# Patient Record
Sex: MALE | Race: WHITE | Employment: FULL TIME | URBAN - METROPOLITAN AREA
[De-identification: names, ages, dates, MRNs, and addresses within clinical notes are randomized per-mention and may not be internally consistent; named-entity substitution may affect disease eponyms.]

---

## 2019-06-19 ENCOUNTER — HOSPITAL ENCOUNTER (OUTPATIENT)
Dept: CARDIAC CATH/INVASIVE PROCEDURES | Age: 29
Discharge: HOME | End: 2019-06-19
Attending: SPECIALIST | Admitting: SPECIALIST
Payer: COMMERCIAL

## 2019-06-19 VITALS
SYSTOLIC BLOOD PRESSURE: 115 MMHG | BODY MASS INDEX: 27.31 KG/M2 | HEIGHT: 76 IN | DIASTOLIC BLOOD PRESSURE: 54 MMHG | TEMPERATURE: 97.8 F | HEART RATE: 62 BPM | WEIGHT: 224.25 LBS | OXYGEN SATURATION: 99 % | RESPIRATION RATE: 20 BRPM

## 2019-06-19 DIAGNOSIS — I48.91 ATRIAL FIBRILLATION, UNSPECIFIED TYPE (HCC): ICD-10-CM

## 2019-06-19 LAB
ANION GAP SERPL CALC-SCNC: 2 MMOL/L (ref 5–15)
BUN SERPL-MCNC: 11 MG/DL (ref 6–20)
BUN/CREAT SERPL: 12 (ref 12–20)
CALCIUM SERPL-MCNC: 9.3 MG/DL (ref 8.5–10.1)
CHLORIDE SERPL-SCNC: 108 MMOL/L (ref 97–108)
CO2 SERPL-SCNC: 29 MMOL/L (ref 21–32)
CREAT SERPL-MCNC: 0.93 MG/DL (ref 0.7–1.3)
GLUCOSE SERPL-MCNC: 102 MG/DL (ref 65–100)
MAGNESIUM SERPL-MCNC: 2.5 MG/DL (ref 1.6–2.4)
POTASSIUM SERPL-SCNC: 4.4 MMOL/L (ref 3.5–5.1)
SODIUM SERPL-SCNC: 139 MMOL/L (ref 136–145)

## 2019-06-19 PROCEDURE — 80048 BASIC METABOLIC PNL TOTAL CA: CPT

## 2019-06-19 PROCEDURE — 83735 ASSAY OF MAGNESIUM: CPT

## 2019-06-19 PROCEDURE — 74011000258 HC RX REV CODE- 258: Performed by: SPECIALIST

## 2019-06-19 PROCEDURE — 92960 CARDIOVERSION ELECTRIC EXT: CPT

## 2019-06-19 PROCEDURE — 77030018729 HC ELECTRD DEFIB PAD CARD -B

## 2019-06-19 PROCEDURE — 74011000250 HC RX REV CODE- 250: Performed by: SPECIALIST

## 2019-06-19 PROCEDURE — 36415 COLL VENOUS BLD VENIPUNCTURE: CPT

## 2019-06-19 PROCEDURE — 99152 MOD SED SAME PHYS/QHP 5/>YRS: CPT

## 2019-06-19 RX ORDER — ASPIRIN 325 MG
325 TABLET ORAL DAILY
COMMUNITY
End: 2019-08-04

## 2019-06-19 RX ADMIN — METHOHEXITAL SODIUM 61.02 MG: 500 INJECTION, POWDER, LYOPHILIZED, FOR SOLUTION INTRAMUSCULAR; INTRAVENOUS; RECTAL at 13:00

## 2019-06-19 RX ADMIN — METHOHEXITAL SODIUM 61.02 MG: 500 INJECTION, POWDER, LYOPHILIZED, FOR SOLUTION INTRAMUSCULAR; INTRAVENOUS; RECTAL at 12:59

## 2019-06-19 NOTE — PROGRESS NOTES
TRANSFER - IN REPORT:    Verbal report received from Ed (name) on Smita Moreno  being received from [facility] (unit) for ordered procedure      Report consisted of patients Situation, Background, Assessment and   Recommendations(SBAR). Information from the following report(s) SBAR was reviewed with the receiving nurse. Opportunity for questions and clarification was provided. Assessment completed upon patients arrival to unit and care assumed.

## 2019-06-19 NOTE — DISCHARGE INSTRUCTIONS
Discharge Instructions:     Medications: Activity:     As tolerated. Diet:     American Heart Association. Follow-up:     Follow up with Tess Schroeder MD on June 26th, 2019 at 9:45am.  [address]  [phone]      If you smoke, STOP!

## 2019-06-19 NOTE — H&P
Date of Surgery Update:  Tamika Wood was seen and examined. History and physical has been reviewed. The patient has been examined. There have been no significant clinical changes since the completion of the originally dated History and Physical.    Signed By: Mary Rizvi MD     2019 12:17 PM           Cornelius Spencer 1990   Office/Outpatient Visit  Visit Date:  09:37 am  Provider: Gabriela Swann MD (Assistant: Fern Schwarz LPN)  Location: [facility])[address] [place] [address] [phone]    Electronically signed by Gabriela Swann MD on  2019 11:48:48 AM                           SUBJECTIVE:    CC:   Mr. Charlene Azar is a 26year old White male. His primary care physician is Cris Marie MD.  This is a 1 day follow-up visit. He has a history of atrial fibrillation. HPI:     Regarding parosxymal atrial fibrillation,  His [id number] score is 0. Current related medications include Aspirin. He has noted an irregular heartbeat. He has not been aware of syncope. Heavy drinking over the weekend. Noticed palpitation Monday evening. Older brother has atrial fibrillation. Another older brother had an enlarged heart and  of cardiac arrest at 25. Had palpitaton 2018 in [facility], saw a cardiologist.  ECG- sinus. Advised to have a monitor. PMH/FMH/SH:   Last Reviewed on 2019 09:43 AM by Homar Cordova  Past Medical History:     ADD/ADHD     PREVENTIVE HEALTH MAINTENANCE       INFLUENZA VACCINE: has never been done     Surgical History:   Surgical/Procedural History: NONE     Family History:   Father: Healthy; Mother: Healthy; Living   Brother(s):  Positive for Sudden Cardiac Death;      Social History:   Social History:   Occupation: consulatant;   Marital Status: Single   Children: None     Tobacco/Alcohol/Supplements:   Last Reviewed on 2019 09:43 AM by Homar Cordova  TOBACCO/ALCOHOL/SUPPLEMENTS Tobacco: He has never smoked. Alcohol: Drinks alcohol on a social basis only. Caffeine:  He admits to consuming caffeine via coffee ( 1 serving per day ). Substance Abuse History:   Last Reviewed on 6/19/2019 09:43 AM by Sarahy Garcia  Substance Use/Abuse:   None     Mental Health History:   Last Reviewed on 6/19/2019 09:43 AM by Sarahy Garcia    Communicable Diseases (eg STDs): Last Reviewed on 6/19/2019 09:43 AM by Sarahy Garcia      Current Problems:   Last Reviewed on 6/19/2019 09:43 AM by Sarahy Garcia  Atrial fibrillation   Palpitations     Allergies:   Last Reviewed on 6/19/2019 09:43 AM by Sarahy Garcia    No Known Drug Allergies. Current Medications:   Last Reviewed on 6/19/2019 09:43 AM by Ulysees Bare  Aspirin (ASA) 325mg Tablet 1 po qd   Vyvanse 20mg Capsules Take 1 capsule(s) by mouth qam     OBJECTIVE:    Vitals:     Historical:   06/18/2019  BP:   127/76 mm Hg ( (left arm, , standing, );)   06/18/2019  BP:   107/74 mm Hg ( (left arm, , sitting, );)   06/18/2019  Wt:   226lbs    Current: 6/19/2019 9:43:19 AM  Ht:  6 ft, 4 in; Wt: 224 lbs;  BMI: 27.3  BP: 115/79 mm Hg (left arm, sitting);  P: 76 bpm    Repeat:   9:43:30 AM     BP:   140/80mm Hg (left arm, standing)     Exams:   GENERAL:  Alert, oriented to person, place and time. HEENT:  Pinkish palpebral  conjunctivae. Anicteric sclerae. NECK:  No jugular vein engorgement. No bruit. CHEST: Equal expansion. Clear breath sounds. No rales, no wheezing. Heart: Irregular rhythm. ABDOMEN:  Soft. Normal active bowel sounds. No tenderness. EXTREMITIES:  No pitting pedal edema. Equal pulses bilaterally. NEURO:  Grossly intact. Procedures:   Atrial fibrillation     ECG INTERPRETATION: See scanned EKG for results.         ASSESSMENT       427.31   I48.91  Atrial fibrillation            DDx:     ORDERS:     Procedures Ordered:     41028  Electrocardiogram, routine with at least 12 leads; with interpretation and report  (In-House)       22464  Education and train for pt self-mgmt by qualified, nonphysician, cornelia 30 minutes; individual pt  (Send-Out)       XECD  Echocardiogram  (In-House)       Other Orders:     [id number]  Queried Patient for Tobacco Use  (Send-Out)           PLAN:      Atrial fibrillation   1. Medication list has been reviewed. Continue current medications. Smoking Status:  Nonsmoker   2. Advised the patient regarding diet, exercise, and lifestyle modification. Explained treatment options for atrial fibrillation. Patient understood and agreed to proceed with electrical cardioversion. 3.  The patient to call the office if there is any change in his cardiac symptoms. 4.  Explained to the patient the importance of controlling his cardiac risk factors. Testing/Procedures:  Echocardiogram - schedule to be done today  Cardioversion - to be done today to be done at at [facility].      Schedule a follow-up appointment in 3 days.

## 2019-06-19 NOTE — PROCEDURES
Indication:  Atrial fibrillation. Procedure:  Informed consent was obtained. The patient was anesthetized with 0.6mg/kg brevital.  The patient was converted from atrial fibrillation to normal sinus rhythm with a single biphasic shock of 360 joules. The patient tolerated the procedure well without obvious complications and was transferred to the holding room in stable condition. Summary:  Successful DCCV of atrial fibrillation to NSR. F/U with Dr. Keyona Chang on 6/26.

## 2019-06-19 NOTE — PROGRESS NOTES
TRANSFER - OUT REPORT:    Verbal report to be given at bedside on Nadia Harris being transferred to [facility] (unit) for routine post - op       Report consisted of patient's Situation, Background, Assessment and   Recommendations(SBAR). Information from the following report(s) SBAR was reviewed with the receiving nurse. Opportunity for questions and clarification was provided.       Patient transported with:   Registered Nurse

## 2019-06-19 NOTE — PROGRESS NOTES
1130    Patient arrived. ID and allergies verified verbally with patient. Pt voices understanding of procedure to be performed. Consent obtained. Pt prepped for procedure. DCCV    1240    TRANSFER - OUT REPORT:    Verbal report given to Larry Gill RN(name) on [facility]  being transferred to  [facility] (unit) for routine progression of care       Report consisted of patients Situation, Background, Assessment and   Recommendations(SBAR). Information from the following report(s) SBAR was reviewed with the receiving nurse. Lines:   Peripheral IV 06/19/19 Right Antecubital (Active)        Opportunity for questions and clarification was provided. Patient transported with:   Registered Nurse    [address] [place] [address] REPORT:    Verbal report received from  [facility] FRANKO RN(name) on [facility]  being received from  (unit) for routine progression of care      Report consisted of patients Situation, Background, Assessment and   Recommendations(SBAR). Information from the following report(s) SBAR was reviewed with the receiving nurse. Opportunity for questions and clarification was provided. Assessment completed upon patients arrival to unit and care assumed. 1335    Discharge instructions reviewed with patient and family. Voiced understanding. Patient given copy of discharge instructions to take home. 1400    Pt sat on side of bed then ambulated around unit and to restroom. Voided. Returned to chair. Pt voices no complaints. 0    Pt discharged via wheeled chair  with family. Personal belongings with patient upon discharge.

## 2019-08-04 ENCOUNTER — HOSPITAL ENCOUNTER (EMERGENCY)
Age: 29
Discharge: HOME | End: 2019-08-04
Attending: STUDENT IN AN ORGANIZED HEALTH CARE EDUCATION/TRAINING PROGRAM
Payer: COMMERCIAL

## 2019-08-04 VITALS
HEIGHT: 76 IN | OXYGEN SATURATION: 98 % | HEART RATE: 66 BPM | RESPIRATION RATE: 12 BRPM | SYSTOLIC BLOOD PRESSURE: 108 MMHG | WEIGHT: 225 LBS | TEMPERATURE: 97.7 F | DIASTOLIC BLOOD PRESSURE: 76 MMHG | BODY MASS INDEX: 27.4 KG/M2

## 2019-08-04 DIAGNOSIS — I48.92 ATRIAL FLUTTER, UNSPECIFIED TYPE (HCC): Primary | ICD-10-CM

## 2019-08-04 LAB
ALBUMIN SERPL-MCNC: 4.4 G/DL (ref 3.5–5)
ALBUMIN/GLOB SERPL: 1.3 {RATIO} (ref 1.1–2.2)
ALP SERPL-CCNC: 48 U/L (ref 45–117)
ALT SERPL-CCNC: 27 U/L (ref 12–78)
ANION GAP SERPL CALC-SCNC: 4 MMOL/L (ref 5–15)
AST SERPL-CCNC: 25 U/L (ref 15–37)
BASOPHILS # BLD: 0 K/UL (ref 0–0.1)
BASOPHILS NFR BLD: 1 % (ref 0–1)
BILIRUB SERPL-MCNC: 0.9 MG/DL (ref 0.2–1)
BUN SERPL-MCNC: 12 MG/DL (ref 6–20)
BUN/CREAT SERPL: 11 (ref 12–20)
CALCIUM SERPL-MCNC: 9.4 MG/DL (ref 8.5–10.1)
CHLORIDE SERPL-SCNC: 108 MMOL/L (ref 97–108)
CO2 SERPL-SCNC: 29 MMOL/L (ref 21–32)
CREAT SERPL-MCNC: 1.07 MG/DL (ref 0.7–1.3)
DIFFERENTIAL METHOD BLD: NORMAL
EOSINOPHIL # BLD: 0.1 K/UL (ref 0–0.4)
EOSINOPHIL NFR BLD: 2 % (ref 0–7)
ERYTHROCYTE [DISTWIDTH] IN BLOOD BY AUTOMATED COUNT: 12.4 % (ref 11.5–14.5)
GLOBULIN SER CALC-MCNC: 3.5 G/DL (ref 2–4)
GLUCOSE SERPL-MCNC: 90 MG/DL (ref 65–100)
HCT VFR BLD AUTO: 46.4 % (ref 36.6–50.3)
HGB BLD-MCNC: 15.6 G/DL (ref 12.1–17)
IMM GRANULOCYTES # BLD AUTO: 0 K/UL (ref 0–0.04)
IMM GRANULOCYTES NFR BLD AUTO: 0 % (ref 0–0.5)
LYMPHOCYTES # BLD: 1.9 K/UL (ref 0.8–3.5)
LYMPHOCYTES NFR BLD: 30 % (ref 12–49)
MAGNESIUM SERPL-MCNC: 2.2 MG/DL (ref 1.6–2.4)
MCH RBC QN AUTO: 31.5 PG (ref 26–34)
MCHC RBC AUTO-ENTMCNC: 33.6 G/DL (ref 30–36.5)
MCV RBC AUTO: 93.5 FL (ref 80–99)
MONOCYTES # BLD: 0.6 K/UL (ref 0–1)
MONOCYTES NFR BLD: 10 % (ref 5–13)
NEUTS SEG # BLD: 3.6 K/UL (ref 1.8–8)
NEUTS SEG NFR BLD: 57 % (ref 32–75)
NRBC # BLD: 0 K/UL (ref 0–0.01)
NRBC BLD-RTO: 0 PER 100 WBC
PLATELET # BLD AUTO: 240 K/UL (ref 150–400)
PMV BLD AUTO: 9.5 FL (ref 8.9–12.9)
POTASSIUM SERPL-SCNC: 4.2 MMOL/L (ref 3.5–5.1)
PROT SERPL-MCNC: 7.9 G/DL (ref 6.4–8.2)
RBC # BLD AUTO: 4.96 M/UL (ref 4.1–5.7)
SODIUM SERPL-SCNC: 141 MMOL/L (ref 136–145)
TROPONIN I SERPL-MCNC: <0.05 NG/ML
WBC # BLD AUTO: 6.3 K/UL (ref 4.1–11.1)

## 2019-08-04 PROCEDURE — 85025 COMPLETE CBC W/AUTO DIFF WBC: CPT

## 2019-08-04 PROCEDURE — 80053 COMPREHEN METABOLIC PANEL: CPT

## 2019-08-04 PROCEDURE — 84484 ASSAY OF TROPONIN QUANT: CPT

## 2019-08-04 PROCEDURE — 99285 EMERGENCY DEPT VISIT HI MDM: CPT

## 2019-08-04 PROCEDURE — 36415 COLL VENOUS BLD VENIPUNCTURE: CPT

## 2019-08-04 PROCEDURE — 93005 ELECTROCARDIOGRAM TRACING: CPT

## 2019-08-04 PROCEDURE — 83735 ASSAY OF MAGNESIUM: CPT

## 2019-08-04 NOTE — DISCHARGE INSTRUCTIONS
Patient Education        Atrial Flutter: Care Instructions  Your Care Instructions    Atrial flutter is a type of heartbeat problem (arrhythmia) that usually causes a fast heart rate. In atrial flutter, a problem with the heart's electrical system causes the two upper parts of the heart (the right atrium and the left atrium) to flutter, or beat very fast. Atrial flutter might be diagnosed using an an electrocardiogram (EKG). An EKG translates the heart's electrical activity into line tracings on paper. Treating atrial flutter is important for several reasons. The change in heartbeat can cause blood clots. The clots can travel from your heart to your brain and cause a stroke. A fast heartbeat can make you feel lightheaded, dizzy, and weak. And over time, it can also increase your risk for heart failure. Atrial flutter is often the result of another heart condition, such as coronary artery disease or some other heart rhythm problems. Making changes to improve your heart health will help you stay healthy and active. Your doctor may prescribe medicines to help slow down your heartbeat. You may also take medicine to help prevent a stroke. In some cases, a procedure called catheter ablation is done to stop atrial flutter. Follow-up care is a key part of your treatment and safety. Be sure to make and go to all appointments, and call your doctor if you are having problems. It's also a good idea to know your test results and keep a list of the medicines you take. How can you care for yourself at home? Medicines    · Take your medicines exactly as prescribed. Call your doctor if you think you are having a problem with your medicine. You will get more details on the specific medicines your doctor prescribes.     · If your doctor has given you a blood thinner to prevent a stroke, be sure you get instructions about how to take your medicine safely.  Blood thinners can cause serious bleeding problems.     · Do not take any vitamins, over-the-counter drugs, or herbal products without talking to your doctor first.    Lifestyle changes    · Do not smoke. Smoking can increase your chance of a stroke and heart attack. If you need help quitting, talk to your doctor about stop-smoking programs and medicines. These can increase your chances of quitting for good.     · Eat a heart-healthy diet.     · Stay at a healthy weight. Lose weight if you need to.     · Limit alcohol to 2 drinks a day for men and 1 drink a day for women. Too much alcohol can cause health problems.     · Avoid colds and flu. Get a pneumococcal vaccine shot. If you have had one before, ask your doctor whether you need another dose. Get a flu shot every year. If you must be around people with colds or flu, wash your hands often. Activity    · Talk to your doctor about what type and level of exercise is safe for you. Start light exercise if your doctor says it is okay. Walking is a good choice. Try for at least 30 minutes on most days of the week. You also may want to swim, bike, or do other activities.     · When you exercise, watch for signs that your heart is working too hard. You are pushing too hard if you can't talk while you exercise. If you become short of breath or dizzy or have chest pain, sit down and rest right away. When should you call for help? Call 911 anytime you think you may need emergency care. For example, call if:    · You have symptoms of a stroke. These may include:  ? Sudden numbness, tingling, weakness, or loss of movement in your face, arm, or leg, especially on only one side of your body. ? Sudden vision changes. ? Sudden trouble speaking. ? Sudden confusion or trouble understanding simple statements. ? Sudden problems with walking or balance.   ? A sudden, severe headache that is different from past headaches.     · You passed out (lost consciousness).    Call your doctor now or seek immediate medical care if:    · You have new or increased shortness of breath.     · You feel dizzy or lightheaded, or you feel like you may faint.     · Your heart rate becomes irregular.     · You can feel your heart flutter in your chest or skip heartbeats. Tell your doctor if these symptoms are new or worse.    Watch closely for changes in your health, and be sure to contact your doctor if you have any problems. Where can you learn more? Go to http://haroon-rafiq.info/. Enter K424 in the search box to learn more about \"Atrial Flutter: Care Instructions. \"  Current as of: July 22, 2018  Content Version: 12.1  © 7929-0969 [employer]. Care instructions adapted under license by [employer] (which disclaims liability or warranty for this information). If you have questions about a medical condition or this instruction, always ask your healthcare professional. [place] [address] any warranty or liability for your use of this information. Patient Education        Atrial Fibrillation: Care Instructions  Your Care Instructions    Atrial fibrillation is an irregular and often fast heartbeat. Treating this condition is important for several reasons. It can cause blood clots, which can travel from your heart to your brain and cause a stroke. If you have a fast heartbeat, you may feel lightheaded, dizzy, and weak. An irregular heartbeat can also increase your risk for heart failure. Atrial fibrillation is often the result of another heart condition, such as high blood pressure or coronary artery disease. Making changes to improve your heart condition will help you stay healthy and active. Follow-up care is a key part of your treatment and safety. Be sure to make and go to all appointments, and call your doctor if you are having problems. It's also a good idea to know your test results and keep a list of the medicines you take. How can you care for yourself at home?   Medicines    · Take your medicines exactly as prescribed. Call your doctor if you think you are having a problem with your medicine. You will get more details on the specific medicines your doctor prescribes.     · If your doctor has given you a blood thinner to prevent a stroke, be sure you get instructions about how to take your medicine safely. Blood thinners can cause serious bleeding problems.     · Do not take any vitamins, over-the-counter drugs, or herbal products without talking to your doctor first.    Lifestyle changes    · Do not smoke. Smoking can increase your chance of a stroke and heart attack. If you need help quitting, talk to your doctor about stop-smoking programs and medicines. These can increase your chances of quitting for good.     · Eat a heart-healthy diet.     · Stay at a healthy weight. Lose weight if you need to.     · Limit alcohol to 2 drinks a day for men and 1 drink a day for women. Too much alcohol can cause health problems.     · Avoid colds and flu. Get a pneumococcal vaccine shot. If you have had one before, ask your doctor whether you need another dose. Get a flu shot every year. If you must be around people with colds or flu, wash your hands often. Activity    · If your doctor recommends it, get more exercise. Walking is a good choice. Bit by bit, increase the amount you walk every day. Try for at least 30 minutes on most days of the week. You also may want to swim, bike, or do other activities. Your doctor may suggest that you join a cardiac rehabilitation program so that you can have help increasing your physical activity safely.     · Start light exercise if your doctor says it is okay. Even a small amount will help you get stronger, have more energy, and manage stress. Walking is an easy way to get exercise. Start out by walking a little more than you did in the hospital. Gradually increase the amount you walk.     · When you exercise, watch for signs that your heart is working too hard.  You are pushing too hard if you cannot talk while you are exercising. If you become short of breath or dizzy or have chest pain, sit down and rest immediately.     · Check your pulse regularly. Place two fingers on the artery at the palm side of your wrist, in line with your thumb. If your heartbeat seems uneven or fast, talk to your doctor. When should you call for help? Call 911 anytime you think you may need emergency care. For example, call if:    · You have symptoms of a heart attack. These may include:  ? Chest pain or pressure, or a strange feeling in the chest.  ? Sweating. ? Shortness of breath. ? Nausea or vomiting. ? Pain, pressure, or a strange feeling in the back, neck, jaw, or upper belly or in one or both shoulders or arms. ? Lightheadedness or sudden weakness. ? A fast or irregular heartbeat. After you call 911, the  may tell you to chew 1 adult-strength or 2 to 4 low-dose aspirin. Wait for an ambulance. Do not try to drive yourself.     · You have symptoms of a stroke. These may include:  ? Sudden numbness, tingling, weakness, or loss of movement in your face, arm, or leg, especially on only one side of your body. ? Sudden vision changes. ? Sudden trouble speaking. ? Sudden confusion or trouble understanding simple statements. ? Sudden problems with walking or balance. ? A sudden, severe headache that is different from past headaches.     · You passed out (lost consciousness).    Call your doctor now or seek immediate medical care if:    · You have new or increased shortness of breath.     · You feel dizzy or lightheaded, or you feel like you may faint.     · Your heart rate becomes irregular.     · You can feel your heart flutter in your chest or skip heartbeats. Tell your doctor if these symptoms are new or worse.    Watch closely for changes in your health, and be sure to contact your doctor if you have any problems. Where can you learn more?   Go to http://haroon-rafiq.info/. Enter U020 in the search box to learn more about \"Atrial Fibrillation: Care Instructions. \"  Current as of: July 22, 2018  Content Version: 12.1  © 9030-9097 [employer]. Care instructions adapted under license by [employer] (which disclaims liability or warranty for this information). If you have questions about a medical condition or this instruction, always ask your healthcare professional. [place] [address] any warranty or liability for your use of this information.

## 2019-08-04 NOTE — ED NOTES

## 2019-08-04 NOTE — ED TRIAGE NOTES
Pt reports feeling heart flutters since 2 AM this AM. States that he feels like he is in Afib. Has PMH of same.

## 2019-08-04 NOTE — ED PROVIDER NOTES
Annette Osorio. Shaheen Valencia is a 26 y.o. male who presents ambulatory to the ED with a c/o palpitations today. Pt reports that he began feeling \"fluttering in his chest since 2am this morning. Pt states that he has had similar episodes of sx in the past, and thinks he is in A-fib. Pt states he took 1 81 mg ASA today. Pt notes that the most recent occurrence of these sx was 2 months ago,and he was shocked back into a Normal Sinus Rhythm by Dr. Fahad Wilburn. Pt specifically denies chest pain, shortness of breath, n/v,d , fever, chills, numbness, tingling, abdominal pain, back pain, cough, leg swelling, dizziness or any other acute sx. Pt denies any recent travel, known sick contacts, or recent illness. PCP: Matthew Mora MD  PMHx significant for: none reported  PSHx significant for:  Colonoscopy  Social Hx: Tobacco: none EtOH: occaisional Illicit drug use: none    There are no further complaints or symptoms at this time. Signed by: Alena Ganser, scribing for Evelia Samson MD on August 4th, 2019 at 5:39 PM.    The history is provided by the patient and medical records. No  was used. No past medical history on file.     Past Surgical History:   Procedure Laterality Date    HX COLONOSCOPY           Family History:   Problem Relation Age of Onset    Heart Disease Brother 18        congenital, sudden death    Cancer Maternal Grandfather        Social History     Socioeconomic History    Marital status: SINGLE     Spouse name: Not on file    Number of children: Not on file    Years of education: Not on file    Highest education level: Not on file   Occupational History    Not on file   Social Needs    Financial resource strain: Not on file    Food insecurity:     Worry: Not on file     Inability: Not on file    Transportation needs:     Medical: Not on file     Non-medical: Not on file   Tobacco Use    Smoking status: Never Smoker   Substance and Sexual Activity    Alcohol use: Yes     Alcohol/week: 2.5 standard drinks     Types: 3 drink(s) per week    Drug use: No    Sexual activity: Not Currently   Lifestyle    Physical activity:     Days per week: Not on file     Minutes per session: Not on file    Stress: Not on file   Relationships    Social connections:     Talks on phone: Not on file     Gets together: Not on file     Attends [employer] service: Not on file     Active member of club or organization: Not on file     Attends meetings of clubs or organizations: Not on file     Relationship status: Not on file    Intimate partner violence:     Fear of current or ex partner: Not on file     Emotionally abused: Not on file     Physically abused: Not on file     Forced sexual activity: Not on file   Other Topics Concern    Not on file   Social History Narrative    Not on file         ALLERGIES: Patient has no known allergies. Review of Systems   Constitutional: Negative for chills and fever. Respiratory: Negative for cough and shortness of breath. Cardiovascular: Positive for palpitations. Negative for chest pain. Gastrointestinal: Negative for abdominal pain, diarrhea, nausea and vomiting. Neurological: Negative for dizziness and headaches. All other systems reviewed and are negative. Vitals:    08/04/19 1653   BP: 119/84   Pulse: 63   Resp: 18   Temp: 97.7 °F (36.5 °C)   SpO2: 97%   Weight: 102.1 kg (225 lb)   Height: 6' 4\" (1.93 m)            Physical Exam   Constitutional: He is oriented to person, place, and time. He appears well-developed and well-nourished. No distress. NAD, AxOx4, speaking in complete sentences     HENT:   Head: Normocephalic and atraumatic. Mouth/Throat: Oropharynx is clear and moist. No oropharyngeal exudate. Eyes: Pupils are equal, round, and reactive to light. Conjunctivae and EOM are normal. Right eye exhibits no discharge. Left eye exhibits no discharge. Neck: Normal range of motion. Neck supple.    Cardiovascular: Normal rate, normal heart sounds and intact distal pulses. Exam reveals no gallop and no friction rub. No murmur heard. A fib / variable  flutter   Pulmonary/Chest: Effort normal and breath sounds normal. No respiratory distress. He has no wheezes. He has no rales. He exhibits no tenderness. Abdominal: Soft. Bowel sounds are normal. He exhibits no distension and no mass. There is no tenderness. There is no rebound and no guarding. nttp   Genitourinary:   Genitourinary Comments: Pt denies urinary/ Testicular/ scrotal or penile  complaints   Musculoskeletal: Normal range of motion. He exhibits no edema, tenderness or deformity. Lymphadenopathy:     He has no cervical adenopathy. Neurological: He is alert and oriented to person, place, and time. He displays normal reflexes. No cranial nerve deficit or sensory deficit. He exhibits normal muscle tone. Coordination normal.   Skin: Skin is warm and dry. Capillary refill takes less than 2 seconds. No rash noted. No erythema. No pallor. Psychiatric: He has a normal mood and affect. Nursing note and vitals reviewed. MDM       Procedures      Chief Complaint   Patient presents with    Irregular Heart Beat       6:19 PM  The patients presenting problems have been discussed, and they are in agreement with the care plan formulated and outlined with them. I have encouraged them to ask questions as they arise throughout their visit.     MEDICATIONS GIVEN:  Medications - No data to display    LABS REVIEWED:  Labs Reviewed   METABOLIC PANEL, COMPREHENSIVE - Abnormal; Notable for the following components:       Result Value    Anion gap 4 (*)     BUN/Creatinine ratio 11 (*)     All other components within normal limits   CBC WITH AUTOMATED DIFF   MAGNESIUM   TROPONIN I       RADIOLOGY RESULTS:  The following have been ordered and reviewed:  _____________________________________________________________________  _____________________________________________________________________    EKG interpretation:   Rhythm: normal sinus rhythm; and regular . Rate (approx.): 54; Axis: normal; P wave: normal; QRS interval: normal ; ST/T wave: normal; Negative acute significant segmental elevations/ compared to study 9/29/2016 a flutter/ fib has replaced nsr;     PROCEDURES:        CONSULTATIONS:       PROGRESS NOTES:      DIAGNOSIS:    1. Atrial flutter, unspecified type ([address]        PLAN:  1- afib/ variable flutter/ Pt offered/ refused electrical cardioversion currently / 'wants to see Dr Yudith Mar';       ED COURSE: The patients hospital course has been uncomplicated. 6:20 PM  Pt told of results thus far/ agrees w/ cardiology consultstion;     CONSULT  NOTE  6:36 PM  Shirlene Carpenter MD spoke with Dr Elham Cerrato. Specialty: cardiology  Discussed pt's hx, disposition, and available diagnostic and imaging results. Reviewed care plans. Consulting physician agrees with plans as outlined 'Harles Sat to cardiovert there if you want'; .    Written by Lily Enriquez MD, ED Scribe as dictated by Shirlene Carpenter MD.     6:37 PM In discussion with Pt/ offered/ refused cardioversion/ 'Will see Dr Yudith Mar tomorrow'; Cameron Lior  results have been reviewed with him. He has been counseled regarding his diagnosis. He verbally conveys understanding and agreement of the signs, symptoms, diagnosis, treatment and prognosis and additionally agrees to Call/ Arrange follow up as recommended with Dr. Gina Friend MD in 24 - 48 hours. He also agrees with the care-plan and conveys that all of his questions have been answered.   I have also put together some discharge instructions for him that include: 1) educational information regarding their diagnosis, 2) how to care for their diagnosis at home, as well a 3) list of reasons why they would want to return to the ED prior to their follow-up appointment, should their condition change or for concerns.

## 2019-08-05 ENCOUNTER — HOSPITAL ENCOUNTER (OUTPATIENT)
Dept: CARDIAC CATH/INVASIVE PROCEDURES | Age: 29
Discharge: HOME | End: 2019-08-05
Attending: SPECIALIST | Admitting: SPECIALIST
Payer: COMMERCIAL

## 2019-08-05 VITALS
SYSTOLIC BLOOD PRESSURE: 110 MMHG | BODY MASS INDEX: 26.85 KG/M2 | HEIGHT: 76 IN | OXYGEN SATURATION: 100 % | RESPIRATION RATE: 16 BRPM | DIASTOLIC BLOOD PRESSURE: 74 MMHG | HEART RATE: 70 BPM | TEMPERATURE: 98.3 F | WEIGHT: 220.5 LBS

## 2019-08-05 DIAGNOSIS — I48.91 ATRIAL FIBRILLATION, UNSPECIFIED TYPE (HCC): ICD-10-CM

## 2019-08-05 LAB
ATRIAL RATE: 416 BPM
CALCULATED R AXIS, ECG10: 17 DEGREES
CALCULATED T AXIS, ECG11: 34 DEGREES
DIAGNOSIS, 93000: NORMAL
Q-T INTERVAL, ECG07: 400 MS
QRS DURATION, ECG06: 100 MS
QTC CALCULATION (BEZET), ECG08: 379 MS
VENTRICULAR RATE, ECG03: 54 BPM

## 2019-08-05 PROCEDURE — 99152 MOD SED SAME PHYS/QHP 5/>YRS: CPT

## 2019-08-05 PROCEDURE — 74011000250 HC RX REV CODE- 250: Performed by: SPECIALIST

## 2019-08-05 PROCEDURE — 74011000258 HC RX REV CODE- 258: Performed by: SPECIALIST

## 2019-08-05 PROCEDURE — 77030018729 HC ELECTRD DEFIB PAD CARD -B

## 2019-08-05 PROCEDURE — 92960 CARDIOVERSION ELECTRIC EXT: CPT

## 2019-08-05 RX ORDER — ASPIRIN 81 MG/1
81 TABLET ORAL DAILY
COMMUNITY

## 2019-08-05 RX ADMIN — METHOHEXITAL SODIUM 130 MG: 500 INJECTION, POWDER, LYOPHILIZED, FOR SOLUTION INTRAMUSCULAR; INTRAVENOUS; RECTAL at 17:10

## 2019-08-05 NOTE — H&P
Date of Surgery Update:  [facility] was seen and examined. History and physical has been reviewed. The patient has been examined.  There have been no significant clinical changes since the completion of the originally dated History and Physical.    Signed By: Criss Kellogg MD     August 5, 2019 5:09 PM

## 2019-08-05 NOTE — PROGRESS NOTES
TRANSFER - OUT REPORT:    Verbal report given to vasyl(name) on [facility] Guadalupe [facility] for routine post - op       Report consisted of patient's Situation, Background, Assessment and   Recommendations(SBAR). Information from the following report(s) SBAR, Procedure Summary and MAR was reviewed with the receiving nurse. Opportunity for questions and clarification was provided.       Patient transported with:   Registered Nurse

## 2019-08-05 NOTE — DISCHARGE INSTRUCTIONS
Discharge Instructions:     Patient Education        Electrical Cardioversion: Care Instructions  Your Care Instructions    Electrical cardioversion is a treatment for an abnormal heartbeat. For example, it may be used to treat atrial fibrillation. In cardioversion, a brief electric shock is given to the heart to reset its rhythm. The shock comes through patches that are put on the outside of your chest. Cardioversion most often restores the heartbeat to normal.  After the procedure, you may have redness where the patches were. (This may look like a sunburn.) Do not drive until the day after a cardioversion. You can eat and drink when you feel ready to. Your doctor may have you take medicines daily to help the heart beat in a normal way and to prevent blood clots. Your doctor may give you medicine before and after cardioversion. This is to help keep your heart in a normal rhythm after the procedure. Instead of electric cardioversion, your doctor may try to change your heartbeat to a normal rhythm by giving you medicine. This is most often done in a clinic or hospital.  You may have had a sedative to help you relax. You may be unsteady after having sedation. It can take a few hours for the medicine's effects to wear off. Common side effects of sedation include nausea, vomiting, and feeling sleepy or tired. The doctor has checked you carefully, but problems can develop later. If you notice any problems or new symptoms, get medical treatment right away. Follow-up care is a key part of your treatment and safety. Be sure to make and go to all appointments, and call your doctor if you are having problems. It's also a good idea to know your test results and keep a list of the medicines you take. How can you care for yourself at home? Medicines    · If the doctor gave you a sedative:  ? For 24 hours, don't do anything that requires attention to detail.  This includes going to work, making important decisions, or signing any legal documents. It takes time for the medicine's effects to completely wear off.  ? For your safety, do not drive or operate any machinery that could be dangerous. Wait until the medicine wears off and you can think clearly and react easily.     · Take your medicines exactly as prescribed. Call your doctor if you think you are having a problem with your medicine. You may take some of the following medicines:  ? Antiarrhythmic medicines such as amiodarone. ? Beta-blockers such as propranolol (Inderal), metoprolol (Lopressor), and carvedilol (Coreg). ? Calcium channel blockers such as diltiazem (Cardizem) and verapamil (Calan). ? Digoxin (Lanoxin). You will get more details on the specific medicines your doctor prescribes.     · If you take a blood thinner, be sure you get instructions about how to take your medicine safely. Blood thinners can cause serious bleeding problems.     · Do not take any vitamins, over-the-counter medicines, or herbal products without talking to your doctor first.    Lifestyle changes    · Do not smoke. Smoking increases your risk of stroke and heart attack. If you need help quitting, talk to your doctor about stop-smoking programs and medicines. These can increase your chances of quitting for good.     · Eat heart-healthy foods.     · Limit alcohol to 2 drinks a day for men and 1 drink a day for women. Activity    · If your doctor recommends it, get more exercise. Walking is a good choice. Bit by bit, increase the amount you walk every day. Try for 30 minutes on most days of the week. You also may want to swim, bike, or do other activities.     · When you exercise, watch for signs that your heart is working too hard. You are pushing too hard if you cannot talk while you are exercising. If you become short of breath or dizzy or have chest pain, sit down and rest immediately.     · Check your pulse regularly.  Place two fingers on the artery at the palm side of your wrist in line with your thumb. If your heartbeat seems uneven or fast, talk to your doctor. When should you call for help? Call 911 anytime you think you may need emergency care. For example, call if:    · You have trouble breathing.     · You passed out (lost consciousness).     · You cough up pink, foamy mucus and you have trouble breathing.     · You have symptoms of a heart attack. These may include:  ? Chest pain or pressure, or a strange feeling in the chest.  ? Sweating. ? Shortness of breath. ? Nausea or vomiting. ? Pain, pressure, or a strange feeling in the back, neck, jaw, or upper belly or in one or both shoulders or arms. ? Lightheadedness or sudden weakness. ? A fast or irregular heartbeat. After you call 911, the  may tell you to chew 1 adult-strength or 2 to 4 low-dose aspirin. Wait for an ambulance. Do not try to drive yourself.     · You have symptoms of a stroke. These may include:  ? Sudden numbness, tingling, weakness, or loss of movement in your face, arm, or leg, especially on only one side of your body. ? Sudden vision changes. ? Sudden trouble speaking. ? Sudden confusion or trouble understanding simple statements. ? Sudden problems with walking or balance. ? A sudden, severe headache that is different from past headaches.    Call your doctor now or seek immediate medical care if:    · You have new or worse nausea or vomiting.     · You have new or increased shortness of breath.     · You are dizzy or lightheaded, or you feel like you may faint.     · You have sudden weight gain, such as more than 2 to 3 pounds in a day or 5 pounds in a week.     · You have increased swelling in your legs, ankles, or feet.    Watch closely for changes in your health, and be sure to contact your doctor if you have any problems. Where can you learn more? Go to http://haroon-rafiq.info/.   Enter I086 in the search box to learn more about \"Electrical Cardioversion: Care Instructions. \"  Current as of: July 22, 2018  Content Version: 12.1  © 2672-8788 [employer]wise, [employer]. Care instructions adapted under license by [employer] (which disclaims liability or warranty for this information). If you have questions about a medical condition or this instruction, always ask your healthcare professional. [address] any warranty or liability for your use of this information. Medications: Activity:     As tolerated. Diet:     American Heart Association. Follow-up:     Follow up with Elna Koyanagi, MD  [address] [place] [address]  [phone]      If you smoke, STOP!

## 2019-08-05 NOTE — PROCEDURES
Indication:  Atrial fibrillation. Procedure:  Informed consent was obtained. The patient was anesthetized with 0.6mg/kg brevital.  The patient was converted from atrial fibrillation to normal sinus rhythm with a single biphasic shock of 360 joules. The patient tolerated the procedure well without obvious complications and was transferred to the holding room in stable condition. Summary:  Successful DCCV of atrial fibrillation to NSR. F/U with Dr. Rakel Aiken after returning from [facility]. Bridgett Montemayor

## 2019-08-05 NOTE — PROGRESS NOTES
3:16 PM      Patient arrived. ID and allergies verified verbally with patient. Pt voices understanding of procedure to be performed. Consent obtained. Pt prepped for procedure. Labs from 1700 8/4/19  K and MG WNL    Patient states he took 325MG ASA this am  Ate egg at 0800 with \"small\" amount of water      4:52 PM    TRANSFER - OUT REPORT:    Verbal report given to Eusebio Kidd RN (name) on [facility]  being transferred to [facility] (unit) for ordered procedure       Report consisted of patients Situation, Background, Assessment and   Recommendations(SBAR). Information from the following report(s) SBAR was reviewed with the receiving nurse. Lines:   Peripheral IV 08/05/19 Left Antecubital (Active)        Opportunity for questions and clarification was provided. Patient transported with:   Registered Nurse        [address]  REPORT:    Verbal report received from [facility] (name) on [facility]  being received from [facility] (unit) for routine progression of care      Report consisted of patients Situation, Background, Assessment and   Recommendations(SBAR). Information from the following report(s) Procedure Summary was reviewed with the receiving nurse. Opportunity for questions and clarification was provided. Assessment completed upon patients arrival to unit and care assumed.       5:21 PM      Patient sitting up in bed eating. No complaints      1730      Discharge instructions reviewed with patient and family. Voiced understanding. Patient given copy of discharge instructions to take home. 1800    Patient ambulated to restroom and back to [facility]  No complaints  Baseline    6:20 PM    Pt discharged via wheelchair with Nurys Curran. Personal belongings with patient upon discharge.

## 2021-01-26 NOTE — PROGRESS NOTES
HISTORY OF PRESENTING ILLNESS      Ada Cochran is a 31 y.o. male with recurrent paroxysmal symptomatic atrial fibrillation s/p DCCV x 2 which triggers with dehydration, alcohol consumption and fatigue. PAST MEDICAL HISTORY     Past Medical History:   Diagnosis Date    Ill-defined condition     paroxysmal afib hx           PAST SURGICAL HISTORY     Past Surgical History:   Procedure Laterality Date    HX COLONOSCOPY            ALLERGIES     No Known Allergies       FAMILY HISTORY     Family History   Problem Relation Age of Onset    Heart Disease Brother 18        congenital, sudden death    Cancer Maternal Grandfather     negative for cardiac disease       SOCIAL HISTORY     Social History     Socioeconomic History    Marital status: SINGLE     Spouse name: Not on file    Number of children: Not on file    Years of education: Not on file    Highest education level: Not on file   Tobacco Use    Smoking status: Never Smoker    Smokeless tobacco: Never Used   Substance and Sexual Activity    Alcohol use: Yes     Alcohol/week: 2.5 standard drinks     Types: 3 Standard drinks or equivalent per week    Drug use: No    Sexual activity: Not Currently         MEDICATIONS     Current Outpatient Medications   Medication Sig    aspirin delayed-release 81 mg tablet Take 81 mg by mouth daily.  lisdexamfetamine (VYVANSE) 20 mg capsule Take 20 mg by mouth daily. No current facility-administered medications for this visit. I have reviewed the nurses notes, vitals, problem list, allergy list, medical history, family, social history and medications. REVIEW OF SYMPTOMS      General: Pt denies excessive weight gain or loss. Pt is able to conduct ADL's  HEENT: Denies blurred vision, headaches, hearing loss, epistaxis and difficulty swallowing. Respiratory: Denies cough, congestion, shortness of breath, SHORT, wheezing or stridor.   Cardiovascular: Denies precordial pain, palpitations, edema or PND  Gastrointestinal: Denies poor appetite, indigestion, abdominal pain or blood in stool  Genitourinary: Denies hematuria, dysuria, increased urinary frequency  Musculoskeletal: Denies joint pain or swelling from muscles or joints  Neurologic: Denies tremor, paresthesias, headache, or sensory motor disturbance  Psychiatric: Denies confusion, insomnia, depression  Integumentray: Denies rash, itching or ulcers. Hematologic: Denies easy bruising, bleeding       PHYSICAL EXAMINATION      Vitals: see vitals section  General: Well developed, in no acute distress. HEENT: No jaundice, oral mucosa moist, no oral ulcers  Neck: Supple, no stiffness, no lymphadenopathy, supple  Heart:  Normal S1/S2 negative S3 or S4. Regular, no murmur, gallop or rub, no jugular venous distention  Respiratory: Clear bilaterally x 4, no wheezing or rales  Abdomen:   Soft, non-tender, bowel sounds are active. Extremities:  No edema, normal cap refill, no cyanosis. Musculoskeletal: No clubbing, no deformities  Neuro: A&Ox3, speech clear, gait stable, cooperative, no focal neurologic deficits  Skin: Skin color is normal. No rashes or lesions.  Non diaphoretic, moist.  Vascular: 2+ pulses symmetric in all extremities       DIAGNOSTIC DATA      EKG:        LABORATORY DATA      Lab Results   Component Value Date/Time    WBC 6.3 08/04/2019 05:04 PM    HGB 15.6 08/04/2019 05:04 PM    HCT 46.4 08/04/2019 05:04 PM    PLATELET 838 14/41/6246 05:04 PM    MCV 93.5 08/04/2019 05:04 PM      Lab Results   Component Value Date/Time    Sodium 141 08/04/2019 05:04 PM    Potassium 4.2 08/04/2019 05:04 PM    Chloride 108 08/04/2019 05:04 PM    CO2 29 08/04/2019 05:04 PM    Anion gap 4 (L) 08/04/2019 05:04 PM    Glucose 90 08/04/2019 05:04 PM    BUN 12 08/04/2019 05:04 PM    Creatinine 1.07 08/04/2019 05:04 PM    BUN/Creatinine ratio 11 (L) 08/04/2019 05:04 PM    GFR est AA >60 08/04/2019 05:04 PM    GFR est non-AA >60 08/04/2019 05:04 PM Calcium 9.4 08/04/2019 05:04 PM    Bilirubin, total 0.9 08/04/2019 05:04 PM    Alk. phosphatase 48 08/04/2019 05:04 PM    Protein, total 7.9 08/04/2019 05:04 PM    Albumin 4.4 08/04/2019 05:04 PM    Globulin 3.5 08/04/2019 05:04 PM    A-G Ratio 1.3 08/04/2019 05:04 PM    ALT (SGPT) 27 08/04/2019 05:04 PM           ASSESSMENT      1. Atrial fibrillation   A. Symptomatic   B. Paroxysmal   C. DCCV  2. ADD       PLAN     Discussed drug therapy (maintenance vs PIP diltiazem) vs AF ablation. He will consider and notify us of how he wishes to proceed. ICD-10-CM ICD-9-CM    1. Atrial fibrillation, unspecified type ([address]  I48.91 427.31      No orders of the defined types were placed in this encounter. FOLLOW-UP       Thank you, Anant Watt MD and Dr. Olu Camacho for allowing me to participate in the care of this extraordinarily pleasant male. Please do not hesitate to contact me for further questions/concerns.          Christian Miranda MD  Cardiac Electrophysiology / Cardiology    [address]  [address] [facility], [address]  [phone] / [phone] Fax   [phone] / [phone] Fax

## 2021-01-27 ENCOUNTER — OFFICE VISIT (OUTPATIENT)
Dept: CARDIOLOGY CLINIC | Age: 31
End: 2021-01-27
Payer: COMMERCIAL

## 2021-01-27 VITALS
HEART RATE: 55 BPM | HEIGHT: 76 IN | DIASTOLIC BLOOD PRESSURE: 80 MMHG | WEIGHT: 233.8 LBS | SYSTOLIC BLOOD PRESSURE: 110 MMHG | OXYGEN SATURATION: 99 % | RESPIRATION RATE: 18 BRPM | BODY MASS INDEX: 28.47 KG/M2

## 2021-01-27 DIAGNOSIS — I48.91 ATRIAL FIBRILLATION, UNSPECIFIED TYPE (HCC): Primary | ICD-10-CM

## 2021-01-27 PROCEDURE — 99205 OFFICE O/P NEW HI 60 MIN: CPT | Performed by: INTERNAL MEDICINE

## 2021-01-27 NOTE — PROGRESS NOTES
Room #: 2        Visit Vitals  /80 (BP 1 Location: Right arm, BP Patient Position: Sitting)   Pulse (!) 55   Resp 18   Ht 6' 4\" (1.93 m)   Wt 233 lb 12.8 oz (106.1 kg)   SpO2 99%   BMI 28.46 kg/m²         Chest pain:  NO  Shortness of breath:  NO  Edema: NO  Palpitations, skipped beats, rapid heartbeat:  NO  Dizziness:  NO    1. Have you been to the ER, urgent care clinic since your last visit? Hospitalized since your last visit? No    2. Have you seen or consulted any other health care providers outside of the [address] [place] [address] since your last visit? Include any pap smears or colon screening.  No      Refills:  NO

## 2023-04-04 ENCOUNTER — APPOINTMENT (OUTPATIENT)
Dept: NON INVASIVE DIAGNOSTICS | Age: 33
End: 2023-04-04
Attending: SPECIALIST
Payer: COMMERCIAL

## 2023-04-04 ENCOUNTER — HOSPITAL ENCOUNTER (OUTPATIENT)
Age: 33
End: 2023-04-04
Attending: EMERGENCY MEDICINE
Payer: COMMERCIAL

## 2023-04-04 LAB
ALBUMIN SERPL-MCNC: 4.5 G/DL (ref 3.5–5)
ALBUMIN/GLOB SERPL: 1.3 (ref 1.1–2.2)
ALP SERPL-CCNC: 51 U/L (ref 45–117)
ALT SERPL-CCNC: 29 U/L (ref 12–78)
ANION GAP SERPL CALC-SCNC: 3 MMOL/L (ref 5–15)
AST SERPL-CCNC: 23 U/L (ref 15–37)
BASOPHILS # BLD: 0 K/UL (ref 0–0.1)
BASOPHILS NFR BLD: 1 % (ref 0–1)
BILIRUB SERPL-MCNC: 0.9 MG/DL (ref 0.2–1)
BUN SERPL-MCNC: 14 MG/DL (ref 6–20)
BUN/CREAT SERPL: 13 (ref 12–20)
CALCIUM SERPL-MCNC: 9.5 MG/DL (ref 8.5–10.1)
CHLORIDE SERPL-SCNC: 105 MMOL/L (ref 97–108)
CO2 SERPL-SCNC: 28 MMOL/L (ref 21–32)
COMMENT, HOLDF: NORMAL
CREAT SERPL-MCNC: 1.05 MG/DL (ref 0.7–1.3)
DIFFERENTIAL METHOD BLD: NORMAL
EOSINOPHIL # BLD: 0.1 K/UL (ref 0–0.4)
EOSINOPHIL NFR BLD: 2 % (ref 0–7)
ERYTHROCYTE [DISTWIDTH] IN BLOOD BY AUTOMATED COUNT: 12.9 % (ref 11.5–14.5)
GLOBULIN SER CALC-MCNC: 3.5 G/DL (ref 2–4)
GLUCOSE SERPL-MCNC: 95 MG/DL (ref 65–100)
HCT VFR BLD AUTO: 46.9 % (ref 36.6–50.3)
HGB BLD-MCNC: 15.6 G/DL (ref 12.1–17)
IMM GRANULOCYTES # BLD AUTO: 0 K/UL (ref 0–0.04)
IMM GRANULOCYTES NFR BLD AUTO: 0 % (ref 0–0.5)
LYMPHOCYTES # BLD: 1.8 K/UL (ref 0.8–3.5)
LYMPHOCYTES NFR BLD: 38 % (ref 12–49)
MAGNESIUM SERPL-MCNC: 2.3 MG/DL (ref 1.6–2.4)
MCH RBC QN AUTO: 30.4 PG (ref 26–34)
MCHC RBC AUTO-ENTMCNC: 33.3 G/DL (ref 30–36.5)
MCV RBC AUTO: 91.2 FL (ref 80–99)
MONOCYTES # BLD: 0.5 K/UL (ref 0–1)
MONOCYTES NFR BLD: 10 % (ref 5–13)
NEUTS SEG # BLD: 2.4 K/UL (ref 1.8–8)
NEUTS SEG NFR BLD: 49 % (ref 32–75)
NRBC # BLD: 0 K/UL (ref 0–0.01)
NRBC BLD-RTO: 0 PER 100 WBC
PLATELET # BLD AUTO: 266 K/UL (ref 150–400)
PMV BLD AUTO: 9.4 FL (ref 8.9–12.9)
POTASSIUM SERPL-SCNC: 4.5 MMOL/L (ref 3.5–5.1)
PROT SERPL-MCNC: 8 G/DL (ref 6.4–8.2)
RBC # BLD AUTO: 5.14 M/UL (ref 4.1–5.7)
SAMPLES BEING HELD,HOLD: NORMAL
SODIUM SERPL-SCNC: 136 MMOL/L (ref 136–145)
TROPONIN I SERPL HS-MCNC: 4 NG/L (ref 0–76)
WBC # BLD AUTO: 4.8 K/UL (ref 4.1–11.1)

## 2023-04-04 PROCEDURE — 84484 ASSAY OF TROPONIN QUANT: CPT

## 2023-04-04 PROCEDURE — 36415 COLL VENOUS BLD VENIPUNCTURE: CPT

## 2023-04-04 PROCEDURE — 99152 MOD SED SAME PHYS/QHP 5/>YRS: CPT

## 2023-04-04 PROCEDURE — 92960 CARDIOVERSION ELECTRIC EXT: CPT

## 2023-04-04 PROCEDURE — 83735 ASSAY OF MAGNESIUM: CPT

## 2023-04-04 PROCEDURE — 93005 ELECTROCARDIOGRAM TRACING: CPT

## 2023-04-04 PROCEDURE — 77030018729 HC ELECTRD DEFIB PAD CARD -B

## 2023-04-04 PROCEDURE — 85025 COMPLETE CBC W/AUTO DIFF WBC: CPT

## 2023-04-04 PROCEDURE — 74011000250 HC RX REV CODE- 250: Performed by: SPECIALIST

## 2023-04-04 PROCEDURE — 96374 THER/PROPH/DIAG INJ IV PUSH: CPT

## 2023-04-04 PROCEDURE — 74011000258 HC RX REV CODE- 258: Performed by: SPECIALIST

## 2023-04-04 PROCEDURE — 80053 COMPREHEN METABOLIC PANEL: CPT

## 2023-04-04 PROCEDURE — 99284 EMERGENCY DEPT VISIT MOD MDM: CPT

## 2023-04-04 RX ADMIN — METHOHEXITAL SODIUM 170 MG: 500 INJECTION, POWDER, LYOPHILIZED, FOR SOLUTION INTRAMUSCULAR; INTRAVENOUS; RECTAL at 14:35

## 2023-04-04 NOTE — ED TRIAGE NOTES
Patient arrives to the ED via POV. Patient sent from Dr. Anjali Castillo office for Afib. Denies SOB or CP.

## 2023-04-04 NOTE — DISCHARGE INSTRUCTIONS
Follow up with Valentina Shelton MD on April 6th, 2023 at [facility].  [address] [facility]  [phone]

## 2023-04-04 NOTE — PROGRESS NOTES
TRANSFER - OUT REPORT:    Verbal report given to MR [employer] on [employer] being transferred to ED for routine progression of care       Report consisted of patient's Situation, Background, Assessment and   Recommendations(SBAR). Information from the following report(s) SBAR was reviewed with the receiving nurse. Opportunity for questions and clarification was provided.       Patient transported with:   Registered Nurse

## 2023-04-04 NOTE — PROCEDURES
Pt went into AF yesterday AM according to symptoms and watch. Presents here today with AF. CHADS-VASc = 0    Indication:  Atrial fibrillation. Procedure:  Informed consent was obtained. The patient was anesthetized with 0.6mg/kg brevital.  The patient was converted from atrial fibrillation to normal sinus rhythm with a single biphasic shock of 200 joules. The patient tolerated the procedure well without obvious complications and was transferred to the holding room in stable condition. Summary:  Successful DCCV of atrial fibrillation to NSR.     F/U with Dr. Jessee Godinez 4/6/23 @ 4pm.

## 2023-04-04 NOTE — ED PROVIDER NOTES
Chris Gift is a 33 yo M with h/o paroxysmal a-fib who is followed by Dr. Reena Rivera who developed palpitations yesterday morning. He notes that they usually quickly resolve on their own but they have persisted til today. He has not had chest pain or shortness of breath. He called Dr. Reena Rivera' office today and they advised that he come to the ED. He took 325mg of aspirin this morning but is not on any other anticoagulation. Past Medical History:   Diagnosis Date    Ill-defined condition     paroxysmal afib hx       Past Surgical History:   Procedure Laterality Date    HX COLONOSCOPY           Family History:   Problem Relation Age of Onset    Heart Disease Brother 18        congenital, sudden death    Cancer Maternal Grandfather        Social History     Socioeconomic History    Marital status: SINGLE     Spouse name: Not on file    Number of children: Not on file    Years of education: Not on file    Highest education level: Not on file   Occupational History    Not on file   Tobacco Use    Smoking status: Never    Smokeless tobacco: Never   Substance and Sexual Activity    Alcohol use: Yes     Alcohol/week: 2.5 standard drinks     Types: 3 Standard drinks or equivalent per week    Drug use: No    Sexual activity: Not Currently   Other Topics Concern    Not on file   Social History Narrative    Not on file     Social Determinants of Health     Financial Resource Strain: Not on file   Food Insecurity: Not on file   Transportation Needs: Not on file   Physical Activity: Not on file   Stress: Not on file   Social Connections: Not on file   Intimate Partner Violence: Not on file   Housing Stability: Not on file         ALLERGIES: Patient has no known allergies. Review of Systems   Constitutional:  Negative for fever. HENT:  Negative for sore throat. Eyes:  Negative for visual disturbance. Respiratory:  Negative for cough. Cardiovascular:  Positive for palpitations. Negative for chest pain. Gastrointestinal:  Negative for abdominal pain. Genitourinary:  Negative for dysuria. Musculoskeletal:  Negative for back pain. Skin:  Negative for rash. Neurological:  Negative for headaches. Vitals:    04/04/23 1153   BP: 122/77   Pulse: 82   Resp: 16   Temp: 97.4 °F (36.3 °C)   SpO2: 98%   Weight: 99.8 kg (220 lb)   Height: 6' 4\" (1.93 m)            Physical Exam  Vitals and nursing note reviewed. Constitutional:       General: He is not in acute distress. Appearance: He is well-developed. HENT:      Head: Normocephalic and atraumatic. Mouth/Throat:      Mouth: Mucous membranes are moist.   Eyes:      Extraocular Movements: Extraocular movements intact. Conjunctiva/sclera: Conjunctivae normal.   Neck:      Trachea: Phonation normal.   Cardiovascular:      Rate and Rhythm: Normal rate. Rhythm irregular. Pulmonary:      Effort: Pulmonary effort is normal. No respiratory distress. Breath sounds: No wheezing or rales. Abdominal:      General: There is no distension. Musculoskeletal:         General: No tenderness. Normal range of motion. Cervical back: Normal range of motion. Skin:     General: Skin is warm and dry. Neurological:      General: No focal deficit present. Mental Status: He is alert. He is not disoriented. Motor: No abnormal muscle tone. ED EKG interpretation:  Rhythm: atrial fib; and irregular. Rate (approx.): 70; Axis: normal; P wave: A-fib; QRS interval: normal ; ST/T wave: normal; Other findings: abnormal ekg. This EKG was interpreted by Christophe Goncalves MD,ED Provider. Medical Decision Making  Paroxysmal a-fib. Rate controlled. Check labs/electrolytes. Followed by Dr. Antony Arguello, will consult. Amount and/or Complexity of Data Reviewed  Labs: ordered. ECG/medicine tests: ordered. 4:18 PM  Patient returns from cardiology lab following cardioversion. Alert, in no distress. Will discharge home.   Will follow-up with cardiology.     Procedures

## 2023-04-05 LAB
ATRIAL RATE: 375 BPM
CALCULATED R AXIS, ECG10: 25 DEGREES
CALCULATED T AXIS, ECG11: 46 DEGREES
DIAGNOSIS, 93000: NORMAL
Q-T INTERVAL, ECG07: 372 MS
QRS DURATION, ECG06: 92 MS
QTC CALCULATION (BEZET), ECG08: 401 MS
VENTRICULAR RATE, ECG03: 70 BPM

## 2023-05-25 RX ORDER — ASPIRIN 81 MG/1
81 TABLET ORAL DAILY
COMMUNITY